# Patient Record
(demographics unavailable — no encounter records)

---

## 2024-11-11 NOTE — ASSESSMENT
[Use of independent historian: [ enter independent historian's relationship to patient ] :____] : As the patient was unable to provide a complete and reliable history, I obtained clinical history from the patient's [unfilled] [FreeTextEntry1] : #folliculitis, trunk/extremities Chronic condition, flaring  - Start CLn was or [employer] hypochlorus acid spray 1-2x/day - Start clinda 1% lotion BID x 1 week PRN flare, SED  #xerosis cutis Dry skin care reviewed:  - Take short showers/baths (avoid hot water)  - Use a mild soap (eg. CeraVe cleanser or Aquaphor)  - Use soap only on areas truly needed (underarms,groin,buttocks,fold areas, feet, face, hair)  - Pat off excess water and put moisturizer on immediately (within 3 min.) Good moisturizing choices include:  1. Cetaphil cream (not baby Cetaphil)  2. CeraVe cream  3. Vanicream cream  4. Aquaphor ointment  5. Vaseline ointment  6. CeraVe ointment  - A moisturizer should always be applied after showering or bathing, but may be applied as many additional times as is necessary.  RTC PRN

## 2024-11-11 NOTE — PHYSICAL EXAM
[FreeTextEntry3] : A focused skin examination was performed per patient preference, and the following findings were noted:  small follicular based papules on upper thighs bilaterally

## 2024-11-11 NOTE — HISTORY OF PRESENT ILLNESS
[FreeTextEntry1] : NPV: bumps on legs  [de-identified] : SHERLYN MATA is a 8 year old who is presenting for evaluation of:   1. Bumps on legs, coming and going. Doesn't wear tight clothes. Mostly itchy at night   Social hx: Here with mom and sister. Dad is a pharmacist with the health system

## 2024-11-11 NOTE — HISTORY OF PRESENT ILLNESS
[FreeTextEntry1] : NPV: bumps on legs  [de-identified] : SHERLYN MATA is a 8 year old who is presenting for evaluation of:   1. Bumps on legs, coming and going. Doesn't wear tight clothes. Mostly itchy at night   Social hx: Here with mom and sister. Dad is a pharmacist with the health system